# Patient Record
Sex: FEMALE
[De-identification: names, ages, dates, MRNs, and addresses within clinical notes are randomized per-mention and may not be internally consistent; named-entity substitution may affect disease eponyms.]

---

## 2023-07-06 ENCOUNTER — NURSE TRIAGE (OUTPATIENT)
Dept: OTHER | Facility: CLINIC | Age: 38
End: 2023-07-06

## 2023-07-06 NOTE — TELEPHONE ENCOUNTER
Patient is calling to cancel her dentist appointment for today. She states she woke up vomiting. Preferred call back number [phone]    Location of patient: [place]    Subjective: Caller states \"I have a dentist appointment today and I cannot make it. I am throwing up. \"     Current Symptoms: vomiting x 2 today     Pain Severity: 0/10     Temperature:  unsure    What has been tried: drinking water    Denies - abdominal pain / blood in emesis / diarrhea    Recommended disposition: [facility] advice provided, patient verbalizes understanding; denies any other questions or concerns.            This triage is a result of a call to the [facility] ([place], [place]), [facility] Nurse Line           Appintment today with dentist  Reason for Disposition   MILD or MODERATE vomiting (e.g., 1 - 5 times / day)    Protocols used: Vomiting-ADULT-AH